# Patient Record
(demographics unavailable — no encounter records)

---

## 2017-02-03 NOTE — MAMMOGRAPHY REPORT
BILATERAL DIGITAL DIAGNOSTIC MAMMOGRAM TOMOSYNTHESIS WITH CAD: 2/2/2017

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  The patient denies
 any current complaints.  





TECHNIQUE:  Breast tomosynthesis in addition to standard 2D mammography was performed. Current study
 was also evaluated with a Computer Aided Detection (CAD) system.  Bilateral CC and MLO 2-D and diaz
synthesis images and spot magnification left CC and ML views were obtained.  



COMPARISON: Comparison is made to exams dated:  2/1/2016 mammogram, 7/30/2015 mammogram, 1/29/2015 m
ammogram, 8/28/2014 mammogram, and 7/30/2015 ultrasound - [facility].   



BREAST COMPOSITION:  There are scattered areas of fibroglandular density in both breasts.  



FINDINGS: There has been no significant interval change compared to prior exams. There are stable po
stsurgical changes in the left upper outer quadrant, with stable density, architectural distortion, 
and surgical clips at the lumpectomy bed.  Mild diffuse left breast skin and trabecular thickening i
s stable, and likely due to prior radiation therapy.  Again noted are multiple clusters of similar-a
ppearing round/punctate calcifications scattered throughout both breasts, not significantly changed 
compared to multiple prior exams dating back to at least the January 2015 and August 2014 exams.  Gi
marleny the long-term stability and benign morphology and distribution, the calcifications are considere
d benign.  No new suspicious masses, calcifications, or areas of architectural distortion are noted 
in either breast.  



IMPRESSION:  ACR BI-RADS CATEGORY 2: BENIGN

There is no mammographic evidence of malignancy in either breast.  Recommend routine bilateral mammo
grams in one year; I would recommend the patient remain a diagnostic patient given the history of le
ft breast cancer and bilateral calcifications.  The patient has been verbally notified of the result
s.  





Approximately 10% of breast cancers are not detected with mammography. A negative mammographic repor
t should not delay biopsy if a clinically suggestive mass is present.



Radha Messer M.D.          

/:2/2/2017 09:39:57  



Imaging Technologist: Connie BAEZA)(QI), [facility]

letter sent: Normal 1/2  

BI-RADS Code: ACR BI-RADS Category 2: Benign

## 2017-11-07 NOTE — RADIATION ONCOLOGY FOLLOW-UP
Radiation Oncology Follow-Up


Date of Visit


Nov 7, 2017.





Reason For Visit


Annual follow-up





Radiation Completion Date


Hypofractionation 5/13/14





Diagnosis





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


Status:  Resolved        Onset Date:  3/5/2014


Stage:  0


Permanent Comment:  Abnormal left breast mammogram


Biopsy-positive for DCIS have logic stage [id number] 


status post completion of radiation therapy utilizing hypo-fractionation 

completed 05/13/2014 received 5006 cGy  Last Edited By: Karyn Tiwari on Oct 

29, 2015 14:18





Interim History


She's been doing well over this past year.  She is noted no changes to her 

breast.  She is noted no masses or tenderness no change of the axilla.  She's 

had no swelling of her arm.  She is up-to-date with mammography.  She does feel 

that she is currently not scheduled for follow-up mammography.  We discussed 

the previous issue of neck discomfort that she was having last year.  This has 

completely resolved.  This did not recur.  She is having issues with pain of 

her ankles and feet.  She is following with an orthopedic surgeon.  She has 

been prescribed new orthotics.





Allergies


Coded Allergies:  


     Penicillins (Verified  Allergy, Intermediate, RASH, 3/5/14)





Home Medications


Scheduled


Furosemide (Lasix), 20 MG PO HS


Spironolactone (Aldactone), 50 MG PO HS


Tamoxifen (Nolvadex), 20 MG PO DAILY


Verapamil Sust Rel (Calan Sr Ext Rel), 180 MG PO DAILY


[Vitamin D], 20,000 INTER.UNIT PO DAILY





Scheduled PRN


Acetaminophen Tab (Tylenol), 650 MG PO Q6H PRN for Pain





Review of Systems


Gastrointestinal:  


   Symptoms:  WNL


Oral:  


   Symptoms:  No Problems


Respiratory:  


   Symptoms:  WNL


Urinary:  


   Symptoms:  WNL


   Comments:  Having workup for microscopic hematuria;


Skin:  


   Symptoms:  No Problems


Breast:  


   Right Upper Arm Measurement:  31.5


   Right Mid Arm Measurement:  23.3


   Right Wrist Measurement:  16.8


   Left Upper Arm Measurement:  30.5


   Left Mid Arm Measurement:  23.3


   Left Wrist Measurement:  16.0


   Arm Dominence:  Right





Physical Exam





Vital Signs








  Date Time  Temp Pulse Resp B/P (MAP) Pulse Ox O2 Delivery O2 Flow Rate FiO2


 


11/7/17 13:12 36.8 64 20 141/62 96   








Pain:  


   Pain Location:  None


   Patient Pain Scale:  0 - 10


   Initial Pain Intensity:  0.0


Fatigue:  None


General Appearance:  no apparent distress


Eyes:  normal inspection, EOMI


ENT:  normal ENT inspection, hearing grossly normal


Neck:  no adenopathy, thyroid normal


Respiratory/Chest:  lungs clear, no respiratory distress, no accessory muscle 

use


Breast:


Breast examination reveals well-healed incision of the left breast.  There are 

no masses or tenderness and no axillary adenopathy.  She has no skin 

retractions or nipple changes.  Using the Avella score cosmesis she has a 

excellent outcome.  The right breast showed no masses or tenderness no axillary 

adenopathy.


Cardiovascular:  regular rate, rhythm, no gallop, no murmur


Abdomen:  non tender, soft


Extremities:  no pedal edema


Neurologic/Psychiatric:  no motor/sensory deficits, alert, normal mood/affect


Skin:  warm/dry





Additional Studies


 











Patient: LILIYA BLAKE 


[facility] Rec: [id number] Address1:  [address] [place] [address]


Address2:   


 


[facility] ID: [id number]


YOB: 1936    Sex: F


Ref Phy: 


Att Phy:  Tejinder Gates MD


April Phy:  Hermes Aguiar D.O.Int.Med.


Inter Phy:  Radha Messer MD  [facility] Zip:  [place] [address]


SC:  C.MAMM


Report #:  [id number]


Technician:  [email or web address]


Diagnosis:  12 MONTH F/U HX LEFT BREAST CA


Service Date:  02/02/17


MNE:  [id number]


 


Ordering Dr: Tejinder Gates MD


CC:  Tejinder Gates MD CONF:  


DICTATED BY:  Radha Messer MD 








 MAMMOGRAPHY REPORT


  





BILATERAL DIGITAL DIAGNOSTIC MAMMOGRAM TOMOSYNTHESIS WITH CAD: 2/2/2017


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  The patient denies any current complaints.  








TECHNIQUE:  Breast tomosynthesis in addition to standard 2D mammography was 

performed. Current study was also evaluated with a Computer Aided Detection (CAD

) system.  Bilateral CC and MLO 2-D and tomosynthesis images and spot 

magnification left CC and ML views were obtained.  





COMPARISON: Comparison is made to exams dated:  2/1/2016 mammogram, 7/30/2015 

mammogram, 1/29/2015 mammogram, 8/28/2014 mammogram, and 7/30/2015 ultrasound - 

[facility].   





BREAST COMPOSITION:  There are scattered areas of fibroglandular density in 

both breasts.  





FINDINGS: There has been no significant interval change compared to prior 

exams. There are stable postsurgical changes in the left upper outer quadrant, 

with stable density, architectural distortion, and surgical clips at the 

lumpectomy bed.  Mild diffuse left breast skin and trabecular thickening is 

stable, and likely due to prior radiation therapy.  Again noted are multiple 

clusters of similar-appearing round/punctate calcifications scattered 

throughout both breasts, not significantly changed compared to multiple prior 

exams dating back to at least the January 2015 and August 2014 exams.  Given 

the long-term stability and benign morphology and distribution, the 

calcifications are considered benign.  No new suspicious masses, calcifications

, or areas of architectural distortion are noted in either breast.  





IMPRESSION:  ACR BI-RADS CATEGORY 2: BENIGN


There is no mammographic evidence of malignancy in either breast.  Recommend 

routine bilateral mammograms in one year; I would recommend the patient remain 

a diagnostic patient given the history of left breast cancer and bilateral 

calcifications.  The patient has been verbally notified of the results.  








Approximately 10% of breast cancers are not detected with mammography. A 

negative mammographic report should not delay biopsy if a clinically suggestive 

mass is present.





Radha Messer M.D.          


ah/:2/2/2017 09:39:57  





Imaging Technologist: Connie BAEZA)(QI), [facility]


letter sent: Normal 1/2  


BI-RADS Code: ACR BI-RADS Category 2: Benign


__________________ ________________


Dictated by: Radha Messer MD


Signed by: Radha Messer MD





Assessment & Plan


Plan: Continue annual mammography.  We did schedule her for a mammogram for 

next year.  Continue regular follow-up with medical oncology and her primary 

care physician.  We asked her to return to our office in 1 year.  She recall 

she has any questions or concerns in the interim.





Total Time


In Follow-Up


I spent 20 minutes speaking to the patient and performing examination.  I spent 

15 minutes reviewing information in completing this note.





Copy To


Liz Linn P.A.; Tejinder Gates MD





Problem Qualifiers





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  


Laterality:  left  Qualified Codes:  D05.12 - Intraductal carcinoma in situ of 

left breast

## 2018-02-06 NOTE — MAMMOGRAPHY REPORT
BILATERAL DIGITAL SCREENING MAMMOGRAM TOMOSYNTHESIS WITH CAD: 2/5/2018

CLINICAL HISTORY: Asymptomatic. Personal history of breast cancer.  





TECHNIQUE:  Breast tomosynthesis in addition to standard 2D mammography was performed. Current study 
was also evaluated with a Computer Aided Detection (CAD) system.  



COMPARISON: Comparison is made to exams dated:  2/2/2017 mammogram, 2/1/2016 mammogram, 7/30/2015 mukul
mogram, 1/29/2015 mammogram, 8/28/2014 mammogram, and 7/8/2013 mammogram - [facility]
er.   



BREAST COMPOSITION:  There are scattered areas of fibroglandular density in both breasts.  



FINDINGS: The left MLO 2-D view is degraded by patient motion.  However, the corresponding left MLO t
omosynthesis images and reconstructed C-view are considered adequate for evaluation.  There are stabl
e postsurgical changes in the 12:00 middle and anterior left breast, at the site of prior lumpectomy.
  There are diffuse bilateral microcalcifications including stable groupings of microcalcifications i
n the lateral and medial left breast, that are similar in appearance dating back to at least 06/27/20
08, therefore likely benign.  No new suspicious mass, architectural distortion or new suspicious micr
ocalcifications are identified.



IMPRESSION:  ACR BI-RADS CATEGORY 1: NEGATIVE

There is no mammographic evidence of malignancy. A 1 year screening mammogram is recommended.  The pa
tient will receive written notification of the results.  





Approximately 10% of breast cancers are not detected with mammography. A negative mammographic report
 should not delay biopsy if a clinically suggestive mass is present.



Yarely Sims M.D.          

ay/:2/5/2018 17:14:19  



Imaging Technologist: Kylah BAEZA)(QI), [facility]

letter sent: Normal 1/2  

BI-RADS Code: ACR BI-RADS Category 1: Negative